# Patient Record
Sex: MALE | Race: WHITE | URBAN - METROPOLITAN AREA
[De-identification: names, ages, dates, MRNs, and addresses within clinical notes are randomized per-mention and may not be internally consistent; named-entity substitution may affect disease eponyms.]

---

## 2020-07-16 ENCOUNTER — HOSPITAL ENCOUNTER (OUTPATIENT)
Age: 50
Discharge: HOME | End: 2020-07-18
Payer: COMMERCIAL

## 2020-07-16 LAB
ALBUMIN SERPL-MCNC: 4.8 G/DL (ref 3.5–5.2)
ALP BLD-CCNC: 93 U/L (ref 40–129)
ALT SERPL-CCNC: 28 U/L (ref 0–40)
ANION GAP SERPL CALCULATED.3IONS-SCNC: 15 MMOL/L (ref 7–16)
AST SERPL-CCNC: 24 U/L (ref 0–39)
BASOPHILS ABSOLUTE: 0.02 E9/L (ref 0–0.2)
BASOPHILS RELATIVE PERCENT: 0.3 % (ref 0–2)
BILIRUB SERPL-MCNC: 0.4 MG/DL (ref 0–1.2)
BUN BLDV-MCNC: 16 MG/DL (ref 6–20)
CALCIUM SERPL-MCNC: 9.6 MG/DL (ref 8.6–10.2)
CHLORIDE BLD-SCNC: 97 MMOL/L (ref 98–107)
CHOLESTEROL, TOTAL: 176 MG/DL (ref 0–199)
CO2: 25 MMOL/L (ref 22–29)
CREAT SERPL-MCNC: 1 MG/DL (ref 0.7–1.2)
EOSINOPHILS ABSOLUTE: 0.14 E9/L (ref 0.05–0.5)
EOSINOPHILS RELATIVE PERCENT: 1.9 % (ref 0–6)
GFR AFRICAN AMERICAN: >60
GFR NON-AFRICAN AMERICAN: >60 ML/MIN/1.73
GLUCOSE BLD-MCNC: 87 MG/DL (ref 74–99)
HCT VFR BLD CALC: 45.3 % (ref 37–54)
HDLC SERPL-MCNC: 46 MG/DL
HEMOGLOBIN: 15.4 G/DL (ref 12.5–16.5)
IMMATURE GRANULOCYTES #: 0.02 E9/L
IMMATURE GRANULOCYTES %: 0.3 % (ref 0–5)
LDL CHOLESTEROL CALCULATED: 98 MG/DL (ref 0–99)
LYMPHOCYTES ABSOLUTE: 2.53 E9/L (ref 1.5–4)
LYMPHOCYTES RELATIVE PERCENT: 35.1 % (ref 20–42)
MCH RBC QN AUTO: 29.8 PG (ref 26–35)
MCHC RBC AUTO-ENTMCNC: 34 % (ref 32–34.5)
MCV RBC AUTO: 87.6 FL (ref 80–99.9)
MONOCYTES ABSOLUTE: 0.51 E9/L (ref 0.1–0.95)
MONOCYTES RELATIVE PERCENT: 7.1 % (ref 2–12)
NEUTROPHILS ABSOLUTE: 3.99 E9/L (ref 1.8–7.3)
NEUTROPHILS RELATIVE PERCENT: 55.3 % (ref 43–80)
PDW BLD-RTO: 12.3 FL (ref 11.5–15)
PLATELET # BLD: 245 E9/L (ref 130–450)
PMV BLD AUTO: 11.5 FL (ref 7–12)
POTASSIUM SERPL-SCNC: 4.2 MMOL/L (ref 3.5–5)
RBC # BLD: 5.17 E12/L (ref 3.8–5.8)
SODIUM BLD-SCNC: 137 MMOL/L (ref 132–146)
TOTAL PROTEIN: 7.9 G/DL (ref 6.4–8.3)
TRIGL SERPL-MCNC: 159 MG/DL (ref 0–149)
TSH SERPL DL<=0.05 MIU/L-ACNC: 1.86 UIU/ML (ref 0.27–4.2)
VLDLC SERPL CALC-MCNC: 32 MG/DL
WBC # BLD: 7.2 E9/L (ref 4.5–11.5)

## 2020-07-16 PROCEDURE — 80053 COMPREHEN METABOLIC PANEL: CPT

## 2020-07-16 PROCEDURE — 85025 COMPLETE CBC W/AUTO DIFF WBC: CPT

## 2020-07-16 PROCEDURE — 80061 LIPID PANEL: CPT

## 2020-07-16 PROCEDURE — 84443 ASSAY THYROID STIM HORMONE: CPT

## 2024-01-30 ENCOUNTER — OFFICE VISIT (OUTPATIENT)
Dept: OTOLARYNGOLOGY | Facility: CLINIC | Age: 54
End: 2024-01-30
Payer: COMMERCIAL

## 2024-01-30 ENCOUNTER — CLINICAL SUPPORT (OUTPATIENT)
Dept: AUDIOLOGY | Facility: CLINIC | Age: 54
End: 2024-01-30
Payer: COMMERCIAL

## 2024-01-30 VITALS — TEMPERATURE: 97.9 F | WEIGHT: 315 LBS

## 2024-01-30 DIAGNOSIS — H93.8X3 SENSATION OF FULLNESS IN BOTH EARS: ICD-10-CM

## 2024-01-30 DIAGNOSIS — H93.13 TINNITUS OF BOTH EARS: ICD-10-CM

## 2024-01-30 DIAGNOSIS — H90.3 SENSORINEURAL HEARING LOSS (SNHL) OF BOTH EARS: ICD-10-CM

## 2024-01-30 DIAGNOSIS — R42 DIZZINESS: Primary | ICD-10-CM

## 2024-01-30 DIAGNOSIS — R51.9 NONINTRACTABLE EPISODIC HEADACHE, UNSPECIFIED HEADACHE TYPE: ICD-10-CM

## 2024-01-30 DIAGNOSIS — H90.3 SENSORINEURAL HEARING LOSS (SNHL) OF BOTH EARS: Primary | ICD-10-CM

## 2024-01-30 PROCEDURE — 92557 COMPREHENSIVE HEARING TEST: CPT | Performed by: AUDIOLOGIST

## 2024-01-30 PROCEDURE — 99204 OFFICE O/P NEW MOD 45 MIN: CPT | Performed by: NURSE PRACTITIONER

## 2024-01-30 PROCEDURE — 92550 TYMPANOMETRY & REFLEX THRESH: CPT | Performed by: AUDIOLOGIST

## 2024-01-30 RX ORDER — TIRZEPATIDE 2.5 MG/.5ML
INJECTION, SOLUTION SUBCUTANEOUS
COMMUNITY
Start: 2024-01-11

## 2024-01-30 RX ORDER — ESOMEPRAZOLE MAGNESIUM 20 MG/1
TABLET, DELAYED RELEASE ORAL
COMMUNITY

## 2024-01-30 ASSESSMENT — PATIENT HEALTH QUESTIONNAIRE - PHQ9
2. FEELING DOWN, DEPRESSED OR HOPELESS: NOT AT ALL
SUM OF ALL RESPONSES TO PHQ9 QUESTIONS 1 AND 2: 0
1. LITTLE INTEREST OR PLEASURE IN DOING THINGS: NOT AT ALL

## 2024-01-30 NOTE — PROGRESS NOTES
Chief Complaint   Patient presents with    Hearing Loss    Tinnitus    Ear Pressure    Ear Fullness     HISTORY:  Ramirez Lazo, age 53 years, was seen for audiogram in conjunction with otolaryngology appointment on 1/30/2024.  Mr. Burnett reports history of low level tinnitus beginning years ago.  Three weeks ago he noted bilateral ear blockage, decreased hearing and increase in tinnitus both ears.  He was seen by his primary care physician was was prescribed oral steroids and ear drops.  He noted an improvement in hearing and pressure but the symptoms returned.  He notes dizziness when bending over and overall imbalance.  He has an extensive history of occupational and recreational noise exposure.    RESULTS:  Prior to testing both external auditory canals were clear and tympanic membranes visualized    Immittance and acoustic reflexes:  Immittance testing yielded TYPE A tympanograms indicating normal middle ear function both ears  Acoustic reflexes were present 500 - 4000 Hz both ears    Audiogram:  Normal hearing levels were obtained 250 - 2000 Hz with a moderate to mild sensorineural hearing loss 3000 -8000 Hz both ears  Speech reception thresholds obtained at 10 dBHL both ears  Speech discrimination scores were 100% at 50 dBHL    Distortion product otoacoustic emissions:  Robust emissions were obtained at 2000 Hz and absent 3000  -8000 Hz both ears    IMPRESSIONS:  Moderate to mild high frequency sensorineural hearing loss with tinnitus both ears    RECOMMENDATIONS:  1.  Follow up with otolaryngology  2.  Retest hearing levels annually    time: 1321 - 1341

## 2024-01-31 NOTE — PROGRESS NOTES
"Subjective   Patient ID: Ramirez Lazo \"Jesse\" is a 53 y.o. male who presents for Vertigo.  HPI  This patient is referred for evaluation of  episodic non-vertiginous dizziness. The patient is not accompanied by anyone. The approximate duration of his complaints is 3 weeks.    The patient describes his dizziness as feeling lightheaded after bending over as well as feeling very off balance.  He denies any true vertigo.  When asked about ear pain, headache, phono-photophobia, visual or motion intolerance, sound or pressure induced symptoms, hearing loss, discharge from ear, tinnitus, aural fullness or autophony, the patient admits to headache for 5 days straight (+ hx of migraine), phono sensitivity, bilateral fullness, bilateral tinnitus, autophony, and some difficulty hearing.  Patient reports that his migraines were very bad in the 1990s through 2000's but then essentially resolved.  Prior to his dizziness, he reports sudden onset of bilateral fullness with significant increase in his bilateral tinnitus.  Since then he describes fluctuating bilateral fullness, tinnitus, a \"metallic tingling sound\" when he is talking.  Patient reports longstanding history of bilateral tinnitus and what he is currently experiencing is much more severe.    When asked about a significant past otological history including history of prior ear surgery, noise exposure, exposure to ototoxic drugs or agents, and/or family history of hearing loss, the patient admits to occupational and recreational noise exposure, father with hearing loss of unknown etiology.    Review of Systems  A comprehensive or 10 points review of the patient´s constitutional, neurological, HEENT, pulmonary, cardiovascular and genito-urinary systems showed only those mentioned in history of present illness.    Objective   Physical Exam  Constitutional: no fever, chills, weight loss or weight gain   General appearance: Appears well, well-nourished, well groomed. No acute " distress.   Communication: Normal communication   Psychiatric: Oriented to person, place and time. Normal mood and affect.   Neurologic: Cranial nerves II-XII grossly intact and symmetric bilaterally.   Head and Face:   Head: Atraumatic with no masses, lesions or scarring.   Face: Normal symmetry, no paralysis, synkinesis or facial tic. No scars or deformities.   TMJ: Bilateral crepitus  Eyes: Conjunctiva not edematous or erythematous   Ears: External inspection of ears with no deformity, scars or masses. Bilateral EACs clear and bilateral TMs intact with no signs of effusions     Neck: Normal appearing, symmetric, trachea midline.   Cardiovascular: Examination of peripheral vascular system shows no clubbing or cyanosis.   Respiratory: No respiratory distress increased work of breathing. Inspection of the chest with symmetric chest expansion and normal respiratory effort.   Skin: No rashes in the head or neck  Bedside occulomotor function assessment for ocular pursuits and saccades, spontaneous nystagmus showed right gaze evoked nystagmus.  Bilateral head thrust negative  Romberg unsteady, patient swaying front to back but able to compensate  Fukuda normal  Tandem gait normal  Michael-Hallpike deferred  My interpretation of the audiogram done today is normal hearing through 2000 Hz sloping to moderate sensorineural hearing loss bilaterally.  Excellent word recognition scores and normal tympanograms.  Assessment/Plan     This patient presents for initial evaluation of acute acquired dizziness and bilateral aural fullness as well as chronic bilateral subjective tinnitus, headache, and bilateral sensorineural hearing loss.    Reassurance given that otologic exam today is normal.  Audiogram was reviewed in detail.  We discussed that the pattern of his hearing loss is consistent with noise exposure, but he is not yet a candidate for hearing amplification.  We discussed that his longstanding tinnitus may be a combination of  his hearing loss and chronic TMJ dysfunction.  We discussed that TMJ dysfunction can also cause aural fullness, but would not cause his dizziness.  Patient states that when he had the episode of sudden aural fullness, tinnitus he did not notice a change in his hearing and he did not have dizziness.  We discussed that Ménière's disease is unlikely.  The physiology of balance control was explained. The likely possible etiologies were reviewed. I believe the patient does not likely have a peripheral vestibular disorder. I recommended further evaluation with VNG/vHIT/VEMP testing.  I am happy to discuss results over the phone.  We discussed that his dizzy symptoms may be a migraine variant.  I recommended dietary modifications for migraine and referral to neurology.  Patient is in agreement with the plan.  All questions were answered to patient's satisfaction.      45 minutes was spent on this patient´s visit. More than 50% of that time was spent in counseling regarding the possible etiologies, test results, treatment options and coordinating care.    This note was created using speech recognition transcription software. Despite proofreading, several typographical errors might be present that might affect the meaning of the content. Please call with any questions.           MARIA GUADALUPE Gardiner-CNP 01/31/24 12:45 PM

## 2024-02-13 ENCOUNTER — APPOINTMENT (OUTPATIENT)
Dept: NEUROLOGY | Facility: HOSPITAL | Age: 54
End: 2024-02-13
Payer: COMMERCIAL

## 2024-02-15 ENCOUNTER — APPOINTMENT (OUTPATIENT)
Dept: CARDIOLOGY | Facility: HOSPITAL | Age: 54
End: 2024-02-15
Payer: COMMERCIAL

## 2024-02-15 ENCOUNTER — HOSPITAL ENCOUNTER (EMERGENCY)
Facility: HOSPITAL | Age: 54
Discharge: HOME | End: 2024-02-15
Attending: STUDENT IN AN ORGANIZED HEALTH CARE EDUCATION/TRAINING PROGRAM
Payer: COMMERCIAL

## 2024-02-15 VITALS
TEMPERATURE: 98.1 F | DIASTOLIC BLOOD PRESSURE: 91 MMHG | HEART RATE: 100 BPM | SYSTOLIC BLOOD PRESSURE: 147 MMHG | RESPIRATION RATE: 18 BRPM | WEIGHT: 315 LBS | BODY MASS INDEX: 39.17 KG/M2 | HEIGHT: 75 IN | OXYGEN SATURATION: 97 %

## 2024-02-15 DIAGNOSIS — R42 VERTIGO: Primary | ICD-10-CM

## 2024-02-15 PROCEDURE — 2500000001 HC RX 250 WO HCPCS SELF ADMINISTERED DRUGS (ALT 637 FOR MEDICARE OP): Performed by: STUDENT IN AN ORGANIZED HEALTH CARE EDUCATION/TRAINING PROGRAM

## 2024-02-15 PROCEDURE — 93005 ELECTROCARDIOGRAM TRACING: CPT

## 2024-02-15 PROCEDURE — 99283 EMERGENCY DEPT VISIT LOW MDM: CPT | Mod: 25 | Performed by: STUDENT IN AN ORGANIZED HEALTH CARE EDUCATION/TRAINING PROGRAM

## 2024-02-15 RX ORDER — MECLIZINE HYDROCHLORIDE 25 MG/1
25 TABLET ORAL 3 TIMES DAILY PRN
Qty: 30 TABLET | Refills: 0 | Status: SHIPPED | OUTPATIENT
Start: 2024-02-15 | End: 2024-02-25

## 2024-02-15 RX ORDER — MECLIZINE HCL 12.5 MG 12.5 MG/1
25 TABLET ORAL ONCE
Status: COMPLETED | OUTPATIENT
Start: 2024-02-15 | End: 2024-02-15

## 2024-02-15 RX ADMIN — MECLIZINE 25 MG: 12.5 TABLET ORAL at 17:12

## 2024-02-15 ASSESSMENT — PAIN SCALES - GENERAL: PAINLEVEL_OUTOF10: 2

## 2024-02-15 ASSESSMENT — LIFESTYLE VARIABLES
HAVE PEOPLE ANNOYED YOU BY CRITICIZING YOUR DRINKING: NO
HAVE YOU EVER FELT YOU SHOULD CUT DOWN ON YOUR DRINKING: NO
EVER HAD A DRINK FIRST THING IN THE MORNING TO STEADY YOUR NERVES TO GET RID OF A HANGOVER: NO
EVER FELT BAD OR GUILTY ABOUT YOUR DRINKING: NO

## 2024-02-15 ASSESSMENT — COLUMBIA-SUICIDE SEVERITY RATING SCALE - C-SSRS
6. HAVE YOU EVER DONE ANYTHING, STARTED TO DO ANYTHING, OR PREPARED TO DO ANYTHING TO END YOUR LIFE?: NO
2. HAVE YOU ACTUALLY HAD ANY THOUGHTS OF KILLING YOURSELF?: NO
1. IN THE PAST MONTH, HAVE YOU WISHED YOU WERE DEAD OR WISHED YOU COULD GO TO SLEEP AND NOT WAKE UP?: NO

## 2024-02-15 ASSESSMENT — PAIN - FUNCTIONAL ASSESSMENT: PAIN_FUNCTIONAL_ASSESSMENT: 0-10

## 2024-02-15 NOTE — ED PROVIDER NOTES
Chief Complaint   Patient presents with    vertigo symptoms     vertigo symptoms. Has appointment with ENT Feb 28th with tests scheduled. But says he can't deal with the ringing until then.         HPI:  53 y.o. male With history of hypertension presenting to the ED with dizziness and tinnitus.  Patient reports having a longstanding history of tinnitus that worsened about 6 weeks ago.  Has fluctuations in hearing and dizziness as well.  He is not currently having any difficulty with dizziness or ambulation but says that the ringing in his ears today was unbearable.  He has been evaluated by ENT for this and has specialized testing that is ordered.  He was hoping that there is something could be done to facilitate the testing or resolved the tinnitus, prompting ED visit today.  Patient has no headache, vision changes, neck stiffness.  No fevers or chills.  No focal numbness/weakness/paresthesias.  No nausea or vomiting.  No recent trauma or injuries.    History provided by: patient  Limitations to history: none    ------------------------------------------------------------------------------------------------------------------------------------------    ED Triage Vitals [02/15/24 1451]   Temperature Heart Rate Respirations BP   36.7 °C (98.1 °F) 100 18 (!) 147/91      Pulse Ox Temp Source Heart Rate Source Patient Position   97 % Temporal Monitor Sitting      BP Location FiO2 (%)     Left arm --          Physical Exam:  Triage vitals reviewed.  Constitutional: Well developed adult in no acute distress, non toxic in appearance  Head: Normocephalic, atraumatic  Skin: Intact, dry. No rashes or lesions.  Eyes: Pupils are equal, reactive to light bilaterally. EOMI without nystagmus. No conjunctival injection.  Neck: Supple. Trachea is midline.  Pulmonary: Normal work of breathing with no accessory muscle use noted.  Clear to auscultation bilaterally.   Cardiovascular: RRR. 2+ radial pulses bilaterally.   Abdomen: Soft,  nondistended. Nontender to palpation.  Extremities: No gross deformities.  Moving all extremities spontaneously without difficulty.  Neuro: Awake and alert. Answers all questions appropriately. Speech is clear. Face is symmetric without facial droop and facial sensation to light touch equal bilaterally. Uvula midline. Tongue protrusion midline. Hearing intact bilaterally. Full and equal shoulder shrug and head turn against resistance. 5/5 motor strength of UEs and LEs. Sensation to light touch intact in all four extremities. Finger to nose and heel to shin intact. No pronator drift. No gait abnormalities.   Psych: Appropriate mood and affect.    ------------------------------------------------------------------------------------------------------------------------------------------    Medical Decision Making:  This patient is a 53 y.o. male who is presenting to the ED with acute on chronic tinnitus with intermittent dizziness and fluctuations in hearing.  He is only currently experiencing the tinnitus.  Neurologic exam is unremarkable.  No other acute neurologic symptoms that would suggest intracranial hemorrhage, CVA, or malignancy.  He is scheduled for audiometry/vestibular testing which I explained cannot be done in the emergency department.  I did reassure patient on his normal exam and my suspicion that life-threatening cause of his tinnitus was unlikely.  Discussed CT head though he politely declined.  He was given meclizine for his symptoms and will be given prescription for this as well.  Did discuss return precautions and patient discharged in stable condition.      Diagnoses as of 02/15/24 1756   Vertigo        Clinical Impression:  Subacute tinnitus and vertigo    Disposition:  Discharge to home    Jaclyn Brown DO  Emergency Medicine         Jaclyn Brown DO  03/05/24 022

## 2024-02-18 LAB
ATRIAL RATE: 83 BPM
P AXIS: 15 DEGREES
PR INTERVAL: 153 MS
Q ONSET: 254 MS
QRS COUNT: 13 BEATS
QRS DURATION: 96 MS
QT INTERVAL: 369 MS
QTC CALCULATION(BAZETT): 434 MS
QTC FREDERICIA: 411 MS
R AXIS: -14 DEGREES
T AXIS: 49 DEGREES
T OFFSET: 438 MS
VENTRICULAR RATE: 83 BPM

## 2024-02-21 ENCOUNTER — CLINICAL SUPPORT (OUTPATIENT)
Dept: AUDIOLOGY | Facility: CLINIC | Age: 54
End: 2024-02-21
Payer: COMMERCIAL

## 2024-02-21 DIAGNOSIS — H93.8X3 SENSATION OF FULLNESS IN BOTH EARS: ICD-10-CM

## 2024-02-21 DIAGNOSIS — H90.3 SENSORINEURAL HEARING LOSS (SNHL) OF BOTH EARS: Primary | ICD-10-CM

## 2024-02-21 DIAGNOSIS — H93.13 TINNITUS OF BOTH EARS: ICD-10-CM

## 2024-02-21 PROCEDURE — 92557 COMPREHENSIVE HEARING TEST: CPT | Performed by: AUDIOLOGIST

## 2024-02-21 PROCEDURE — 92567 TYMPANOMETRY: CPT | Performed by: AUDIOLOGIST

## 2024-02-21 NOTE — PROGRESS NOTES
"AUDIOMETRIC EVALUATION       Name:  Ramirez Bedoya"Kamla"  :  1970  Age:  53 y.o.  Date of Evaluation:  2024     HISTORY  Ramirez Woodson" was seen today for a hearing evaluation due to sudden decrease in left ear hearing and increase in tinnitus. Reports woke up and could barely hear from the left ear. Reports on his drive into the appointment noticed a warm liquid feeling in his left (inside, could not feel liquid when he touched his ear) and his hearing returned. Reports tinnitus is louder in the left ear still. Has had fluctuating hearing loss, fullness, ringing, and imbalance, dizziness when bending over for the last 2 months. History of noise exposure.    Denies aural pain, drainage, fullness, history of familial hearing loss.    PROCEDURE:  Otoscopic Evaluation:    RIGHT: Clear ear canal and tympanic membrane visualized.  LEFT:  Clear ear canal and tympanic membrane visualized.    Immittance: Tympanometry (226 Hz probe tone) and Stapedial Acoustic Reflexes Thresholds (ART)(Probe ear):  RIGHT: Normal middle ear pressure, mobility, and ear canal volume. Ipsilateral ART present 500-2000 Hz.  LEFT: Normal middle ear pressure, mobility, and ear canal volume. Ipsilateral ART present 500-2000 Hz.    Pure Tone and Speech Audiometry:    Test Technique: Pure Tone Audiometry via insert earphones  Test Reliability: good    RIGHT: Normal hearing through 2000 Hz sloping to moderately severe  sensorineural hearing loss through 8000 Hz. Word Recognition score was excellent using recorded material (NU-6 10-word list ordered by difficulty).   LEFT:  Normal hearing through 2000 Hz sloping to moderately severe  sensorineural hearing loss through 8000 Hz. Word Recognition score was excellent using recorded material (NU-6 10-word list ordered by difficulty).     Distortion Product Otoacoustic Emissions (DPOAE):  DPOAE assesses cochlear outer hair cell function at the frequencies tested in quarter-octave bands " "(7174-4949 Hz)        RIGHT: Absent at all frequencies tested.        LEFT: Present at 0964-7371 and 5001-2533 Hz, absent at all other frequencies tested.    Present OAEs suggest normal cochlear outer hair cell function.  Absent OAEs are consistent with some degree of hearing loss and/or outer hair cell dysfunction.     EVALUATION  See scanned Audiogram in \"Media\".    IMPRESSIONS:  Today's test results indicate essentially stable hearing as compared to Jan, 2024 testing, slight decrease in air conduction/hearing at 500 Hz in the left ear. He reports different sound quality of speech in the left ear during testing.    RECOMMENDATIONS:  Continue medical follow-up with physician.  Return for audiologic assessment in conjunction with otologic care or annually.   Ringing of the ears, also known as tinnitus is often a symptom of hearing loss but can be associated with something other than the ears. Tinnitus coping strategies discussed including amplification and staying in a sound enriched environment (use of a fan or white/musical sound maker at night or when tinnitus is bothersome). If tinnitus becomes bothersome, there are other FDA devices that may provide relief from tinnitus including maskers within amplification and other stimulating devices.    PATIENT EDUCATION:   Discussed results and recommendations with Ramirez Lazo \"Jesse\".  Questions were addressed and the patient was encouraged to contact our department ([phone]) should concerns arise.    BRITTNEY Asher, CCC-A  Senior Clinical Audiologist    TIME: 340-400    "

## 2024-02-27 NOTE — PROGRESS NOTES
"ADULT BALANCE FUNCTION TEST (BFT)      Name:  Ramirez Lazo \"Kamla"  :  1970  Age:  53 y.o.  Date of Evaluation:  2024     IMPRESSIONS     Overall normal vestibular examination; no evidence of active vestibular system involvement to account for patient reported symptoms. There were no indications of peripheral or central vestibular system pathway involvement. There were no indications of active Benign Paroxysmal Positional Vertigo (BPPV) present. Normal observation of gait and transfers. Bedside postural control findings demonstrate normal functional balance with varying sensory information measured on the mCTSIB. Patient's risk of falling based on today's evaluation is low.    RECOMMENDATIONS     Consider re-evaluation as medically indicated.  Maintain a healthy lifestyle to help body function overall.  Continue monitoring per ENT/PCP preference.    Time: 8269-8662    HISTORY     Patient was seen for Balance Function Testing (BFT) due to a history of dizziness/imbalance. Vestibular case history collected via patient-clinician interview, patient chart review, and patient questionnaires.    Patient reported history of dizziness described as lightheadedness and unsteadiness.  Symptoms began 2 months ago.  Episodes occur *** and last several *** before subsiding.  Most recent episode occurred ***.  Associated symptoms include ***.  Symptoms are provoked by bending over, .  Symptoms are alleviated by ***.  Overall the patient's symptoms have *** over time.  This patient has had a total of *** falls due to their symptoms.   Medical history includes migraines, sudden hearing loss, bilateral aural fullness and tinnitus, .  Denied any symptoms provoked by sneezing or coughing, as well as any presence of autophony.   Denied any recent medication changes.   Patient reported complying with pre-test instructions. No significant neck pain or restriction which would contraindicate portions of testing today.    Most " "recent audiologic evaluation performed on 2/27/2024  by MALVIN Santana under the supervision of Yesi Jamison CCC-A CCVR  revealed ***. Tympanometry revealed normal eardrum mobility and canal volume, bilaterally.    Olga Levy CNP on 1/30/2024:  Bedside occulomotor function assessment for ocular pursuits and saccades, spontaneous nystagmus showed right gaze evoked nystagmus.  Bilateral head thrust negative  Romberg unsteady, patient swaying front to back but able to compensate  Fukuda normal  Tandem gait normal  [place]-Hallpike deferred    EVALUATION     See VNG, vHIT, & VEMP Raw Data in \"Media\"    TEST RESULTS     BEDSIDE ASSESSMENT TEST  The bedside assessment is an optional portion of the test battery to further assist in differential diagnosis and screen for eye abnormalities which may affect testing.    Otoscopy: unremarkable.  Tympanometry: normal ear canal volume, peak pressure, and compliance, consistent with normal middle ear function (Type A), bilaterally.***  Extra-Ocular Range of Motion: normal. Extra-Ocular Range of Motion is performed to evaluate any eye abnormalities prior to testing.  Cover/Uncover: normal. Cover/Uncover test is performed to evaluate for skewed deviation of the eyes prior to testing.  Cervical Neck Exam: normal. Cervical Neck is performed to evaluate any restrictions or pain with neck movement prior to testing.  Vertebral Artery Screen: normal. Vertebral Artery Screen is performed to evaluate for vertebrobasilar insufficiency prior to testing.   Ocular Counter-Roll: normal. Ocular Counter-Roll is performed to evaluate ocular tilt reaction and assess otolith organ function prior to testing.  VOR Cancellation: normal. VOR Cancellation is performed to evaluate fixation suppression prior to testing.  Dynamic Visual Acuity: normal. DVA is performed to evaluate the VOR and visual/vestibular interaction.  Modified Clinical Test of Sensory Interaction on Balance (mCTSIB): normal. mCTSIB " is performed to evaluate balance with varying sensory information.      VIDEONYSTAGMOGRAPHY (VNG) TEST  VNG provides objective indications of peripheral and central vestibulo-ocular pathway involvement. Ocular motor testing to visually guided targets is conducted using a dual channel video-recording technique for the recording of eye movement in the horizontal and vertical planes. Air caloric testing is performed at 48 degrees C and 24 degrees C.    Spontaneous Nystagmus test was absent. Spontaneous nystagmus testing may help with the identification of an acute or uncompensated peripheral vestibular lesion.   Gaze Nystagmus test was normal. Gaze nystagmus testing is to evaluate for nystagmus that is evoked by holding eye gaze in any particular direction. True gaze nystagmus is amplified when vision is denied.   Random Saccades test was normal. Random saccade testing is to evaluate patient's ability to make fast random eye movements along a horizontal moving target.   Smooth Pursuit/Tracking test was normal. Smooth pursuit/tracking testing is to evaluate the ability to move eyes with a single smoothly moving target.   Optokinetic nystagmus testing was normal. This full-field OPK test is to evaluate the ability of central nervous system to stabilize vision during sustained head movement after the VOR system loses effectiveness.   Michael-Hallpike testing was normal. [place]-Hallpike testing is to provide a diagnosis of Benign Paroxysmal Positional Vertigo (BPPV) of the vertical semicircular canals on the side which is most affected.  Roll testing was normal. Roll testing is to provide a diagnosis of Benign Paroxysmal Positional Vertigo (BPPV) of the horizontal semicircular canals on the side which is most affected.  Positional testing was normal. Positional testing is to evaluate patient's ability to hold a steady gaze while in different positions.  *** caloric testing was normal. Unilateral weakness of ***% to the *** which  is normal and a directional preponderance of ***% to the *** which is normal. Caloric testing is to evaluate for peripheral vestibular lesion.    NOTE: monothermal caloric testing is indicated when:  slow phase velocity of the nystagmus is 8 degrees/second or greater  less than 15% difference in the degree of nystagmus between the ears  spontaneous or positional nystagmus observed during testing is less than 5 degrees/second      VIDEO HEAD IMPULSE TEST (vHIT)  The vHIT procedure provides objective assessment of the high frequency vestibulo-ocular reflex (VOR) for each semicircular canal. Rapid, random horizontal and vertical thrusts are applied to the patient's head to provoke the VOR. The vHIT procedure includes two separate paradigms: Head Impulse Paradigm (HIMP) and Suppression Head Impulse Paradigm (SHIMP). SHIMP is an optional paradigm that is not appropriate to perform for every patient. However, it is appropriate to perform SHIMP when there is verified evidence of possible vestibulopathy in the traditional HIMP test.     Head Impulse Paradigm (HIMP)   Right Ear   Canal Gain Overt Saccades Covert Saccades   Lateral *** absent absent   Anterior *** absent absent   Posterior *** absent absent        Head Impulse Paradigm (HIMP)   Left Ear   Canal Gain Overt Saccades Covert Saccades   Lateral *** absent absent   Anterior *** absent absent   Posterior *** absent absent     Total gain for all canals tested were within normal limits (<0.80 is abnormal for lateral, <0.70 is abnormal for vertical).  There was no evidence of overt or covert saccades throughout testing.      Suppression Head Impulse Paradigm (SHIMP)    Canal Gain Corrective Anti-Compensatory Saccades   Right Lateral *** present   Left Lateral *** present     Total gain for lateral canals tested were within normal limits (<0.80 is abnormal for lateral).  There was evidence of appropriate corrective anti-compensatory saccades throughout  testing.      Testing and interpretation of results completed by ELLE Santana and Yesi Jamison CCC-A CCKALA. It was our pleasure to evaluate this patient.         Yesi Jamison, CCC-A CCVR  Senior Clinical Vestibular Audiologist

## 2024-02-28 ENCOUNTER — CLINICAL SUPPORT (OUTPATIENT)
Dept: AUDIOLOGY | Facility: CLINIC | Age: 54
End: 2024-02-28
Payer: COMMERCIAL

## 2024-02-28 DIAGNOSIS — R42 DIZZINESS: Primary | ICD-10-CM

## 2024-02-28 PROCEDURE — 92540 BASIC VESTIBULAR EVALUATION: CPT

## 2024-02-28 PROCEDURE — 92537 CALORIC VSTBLR TEST W/REC: CPT

## 2024-02-28 PROCEDURE — 92519 VEMP TST I&R CERVICAL&OCULAR: CPT

## 2024-02-28 NOTE — PATIENT INSTRUCTIONS
BALANCE FUNCTION TEST (BFT)  AFTER VISIT SUMMARY      TESTING SUMMARY     The purpose of today's testing was to evaluate for any vestibular system (inner ear) involvement to account for your symptoms of dizziness/imbalance. Deep inside each of your ears, there are 5 balance organs which contribute to your ability to maintain balance and reduce dizziness. Our vestibular system involves 3 semicircular canals (“spinning detectors”) and 2 otolith organs (“gravity sensors”).    IMPRESSIONS     Based on today's evaluation, your vestibular system appears to be borderline weakened on the left and possibly contributing as a source for your symptoms. Further investigation is warranted at this time.    RECOMMENDATIONS     Continue medical follow up with Olga Levy CNP.   Consider re-evaluation as medically indicated.  Maintain a healthy lifestyle to help body function overall.    Testing and interpretation of results completed by Yesi Jamison CCC-A CCVR. It was my pleasure to evaluate this patient.       Yesi Jamison, CCC-A CCVR  Senior Clinical Vestibular Audiologist

## 2024-02-28 NOTE — PROGRESS NOTES
"ADULT BALANCE FUNCTION TEST (BFT)      Name:  Ramirez Lazo \"Kamla"  :  1970  Age:  53 y.o.  Date of Evaluation:  2024     IMPRESSIONS     Suspected peripheral vestibular involvement given the following: borderline asymmetric caloric irrigations (weaker to the left). Note that current clinical balance tests cannot distinguish between lesions of the labyrinth, VIIIth nerve, or root entry zone of the brainstem vestibular nuclei, thus the phrase peripheral refers to all of the structures. No further indications of peripheral vestibular involvement.     There were no indications of central vestibular system pathway involvement. There were no indications of active Benign Paroxysmal Positional Vertigo (BPPV) present. Normal observation of gait and transfers. Patient's risk of fall based on today's evaluation is low.    RECOMMENDATIONS     Consider vestibular physical therapy to address uncompensated unilateral vestibulopathy. Emphasis on gaze stabilization exercises for VOR gain, habituation exercises to triggers, and fall risk prevention.   Further investigation into possible Meniere's Disease to account for patient reported symptoms is warranted given fluctuating auditory symptoms with onset of dizziness.  Consider re-evaluation as medically indicated.  Maintain a healthy lifestyle to help body function overall.  Continue monitoring per ENT/PCP preference.    Time: 2615-9957    HISTORY     Patient was seen for Balance Function Testing (BFT) due to a history of dizziness/imbalance. Vestibular case history collected via patient-clinician interview, patient chart review, and patient questionnaires.    Patient reported history of dizziness described as lightheadedness and unsteadiness.  Symptoms began 2 months ago and fluctuates daily.  Episodes occur daily and last several seconds to minutes before subsiding.  Associated symptoms include rocking sensation/bumping sensation, vertigo/spinning (extremely brief), " "and fluctuating \"head cold\" sensation.  Symptoms are provoked by bending over, looking down, and standing quickly, however notes symptoms can occur without any specific triggers.  Symptoms are unable to be alleviated.  Overall the patient's symptoms have become more consistent over time.  This patient has had no true falls due to their symptoms.   Medical history includes migraines, sudden fluctuating hearing loss, bilateral fluctuating aural fullness and tinnitus, and previous shoulder surgery (one month prior to onset of symptoms).  Denied any symptoms provoked by sneezing or coughing, as well as any presence of autophony.   Patient reported complying with pre-test instructions. No significant neck pain or restriction which would contraindicate portions of testing today.    Most recent audiologic evaluation performed on 02/21/2024 by Yesi Carpio CCC-A revealed normal hearing sensitivity sloping to a moderately-severe SNHL, bilaterally. Tympanometry revealed normal eardrum mobility and canal volume, bilaterally.    Most recent vertigo evaluation performed on 01/30/2024 by Olga Levy CNP revealed bedside occulomotor function assessment for ocular pursuits and saccades was normal, spontaneous nystagmus showed right gaze evoked nystagmus, bilateral head thrust negative, Romberg unsteady (patient swaying front to back but able to compensate), Fukuda normal, Tandem gait normal, Tipton-Hallpike deferred.    EVALUATION     See VNG, vHIT, & VEMP Raw Data in \"Media\"    TEST RESULTS     BEDSIDE ASSESSMENT TEST  The bedside assessment is an optional portion of the test battery to further assist in differential diagnosis and screen for eye abnormalities which may affect testing.    Otoscopy: unremarkable.  Extra-Ocular Range of Motion: normal. Extra-Ocular Range of Motion is performed to evaluate any eye abnormalities prior to testing.  Cover/Uncover: normal. Cover/Uncover test is performed to evaluate for skewed " deviation of the eyes prior to testing.  Cervical Neck Exam: normal. Cervical Neck is performed to evaluate any restrictions or pain with neck movement prior to testing.  Vertebral Artery Screen: normal. Vertebral Artery Screen is performed to evaluate for vertebrobasilar insufficiency prior to testing.   Ocular Counter-Roll: normal. Ocular Counter-Roll is performed to evaluate ocular tilt reaction and assess otolith organ function prior to testing.  VOR Cancellation: normal. VOR Cancellation is performed to evaluate fixation suppression prior to testing.      VIDEONYSTAGMOGRAPHY (VNG) TEST  VNG provides objective indications of peripheral and central vestibulo-ocular pathway involvement. Ocular motor testing to visually guided targets is conducted using a dual channel video-recording technique for the recording of eye movement in the horizontal and vertical planes. Air caloric testing is performed at 48 degrees C and 24 degrees C.    Spontaneous Nystagmus test was absent. Spontaneous nystagmus testing may help with the identification of an acute or uncompensated peripheral vestibular lesion.   Gaze Nystagmus test was normal. Gaze nystagmus testing is to evaluate for nystagmus that is evoked by holding eye gaze in any particular direction. True gaze nystagmus is amplified when vision is denied.   Random Saccades test was normal. Random saccade testing is to evaluate patient's ability to make fast random eye movements along a horizontal moving target.   Smooth Pursuit/Tracking test was normal. Smooth pursuit/tracking testing is to evaluate the ability to move eyes with a single smoothly moving target.   Optokinetic nystagmus testing was normal at 40 d/s. This full-field OPK test is to evaluate the ability of central nervous system to stabilize vision during sustained head movement after the VOR system loses effectiveness.   Michael-Hallpike testing was normal. Of note, patient reported slight vertigo/spinning sensation  in the head left position. Symptoms were brief and minimal. Intermittent right-beating nystagmus present. Nystagmus reduced with fixation light. Patient did not report symptoms of dizziness. Not clinically significant finding or indicative of true BPPV. Providence-Hallpike testing is to provide a diagnosis of Benign Paroxysmal Positional Vertigo (BPPV) of the vertical semicircular canals on the side which is most affected.  Roll testing was normal. Roll testing is to provide a diagnosis of Benign Paroxysmal Positional Vertigo (BPPV) of the horizontal semicircular canals on the side which is most affected.  Positional testing was normal. Of note, persistent low velocity (2 d/s) right-beating nystagmus present in the head left position. Nystagmus reduced with fixation light. Patient did not report symptoms of dizziness. Not clinically significant finding.  Positional testing is to evaluate patient's ability to hold a steady gaze while in different positions.  Bithermal caloric testing was borderline abnormal. Unilateral weakness of 22% to the left which is borderline abnormal and a directional preponderance of 6% to the right which is normal. Caloric testing is to evaluate for peripheral vestibular lesion.      VIDEO HEAD IMPULSE TEST (vHIT)  The vHIT procedure provides objective assessment of the high frequency vestibulo-ocular reflex (VOR) for each semicircular canal. Rapid, random horizontal and vertical thrusts are applied to the patient's head to provoke the VOR. The vHIT procedure includes two separate paradigms: Head Impulse Paradigm (HIMP) and Suppression Head Impulse Paradigm (SHIMP). SHIMP is an optional paradigm that is not appropriate to perform for every patient. However, it is appropriate to perform SHIMP when there is verified evidence of possible vestibulopathy in the traditional HIMP test.     Head Impulse Paradigm (HIMP)   Right Ear   Canal Gain Overt Saccades Covert Saccades   Lateral 1.06 absent absent    Anterior 1.02 absent absent   Posterior 1.72* absent absent        Head Impulse Paradigm (HIMP)   Left Ear   Canal Gain Overt Saccades Covert Saccades   Lateral 1.06 absent absent   Anterior 1.69* absent absent   Posterior 1.16 absent absent     Total gain for all canals tested were within normal limits (<0.80 is abnormal for lateral, <0.70 is abnormal for vertical).  There was no evidence of overt or covert saccades throughout testing. *Of note abnormally high gain value likely artifact from camera weight.      CERVICAL VESTIBULAR EVOKED MYOGENIC POTENTIALS (cVEMP):  The cVEMP procedure is an evoked potential used to test the saccule and its afferent pathway. An asymmetry ratio is utilized to determine side of lesion. The cVEMP was recorded with the patient cervical extension to produce isolated contraction of the ipsilateral sternocleidomastoid (SCM) muscle. The cVEMP was recorded using a 500 Hz tone burst or 1000 Hz tone burst at a rate of 5.1.      Ear Presentation Level Amplitude P1 Latency N1 Latency  Amplitude Asymmetry Ratio   Right 95 dB nHL 92.32 µV 16.33 ms 24.67 ms 7%   Left 95 dB nHL 80.35 µV 16.00 ms 24.00 ms       Replicable cVEMP responses were within normal limits, bilaterally. The amplitude asymmetry ratio of 7% was not significant (>33% = abnormal).     Superior Canal Dehiscence Screening (75 dB nHL): Negative bilaterally        OCULAR VESTIBULAR EVOKED MYOGENIC POTENTIALS (oVEMP)  The oVEMP procedure is an evoked potential used to test the utricle and its afferent pathway. An asymmetry ratio is utilized to determine side of lesion. This is a contralateral recording. The oVEMP was recorded with the patient seated upright with eyes tilted upward to produce isolated contraction of the contralateral inferior oblique muscle. The oVEMP were recorded using a 500 Hz, 2000 Hz, 4000 Hz tone burst at a rate of 5.1.       Ear Presentation Level Amplitude N1 Latency  P1 Latency  Amplitude Asymmetry Ratio    Right 100 dB nHL 5.24 µV 13.67 ms 16.67 ms Could not calculate due to threshold difference.   Left 95 dB nHL 3.50 µV 14.00 ms 17.00 ms       Replicable oVEMP responses were recorded, bilaterally. The amplitude asymmetry ratio could not be calculated due to threshold difference.     Meniere's Disease Screening (2000 Hz): Negative bilaterally  Superior Canal Dehiscence Screening (4000 Hz): Negative bilaterally      Testing and interpretation of results completed by Yesi Jamison CCC-A CCKALA. It was my pleasure to evaluate this patient.       Yesi Jamison, CCC-A CCVR  Senior Clinical Vestibular Audiologist

## 2024-03-13 ENCOUNTER — APPOINTMENT (OUTPATIENT)
Dept: OTOLARYNGOLOGY | Facility: CLINIC | Age: 54
End: 2024-03-13
Payer: COMMERCIAL

## 2024-03-13 ENCOUNTER — APPOINTMENT (OUTPATIENT)
Dept: AUDIOLOGY | Facility: CLINIC | Age: 54
End: 2024-03-13
Payer: COMMERCIAL

## 2024-03-20 ENCOUNTER — APPOINTMENT (OUTPATIENT)
Dept: NEUROLOGY | Facility: CLINIC | Age: 54
End: 2024-03-20
Payer: COMMERCIAL

## 2024-03-21 ENCOUNTER — TELEMEDICINE (OUTPATIENT)
Dept: OTOLARYNGOLOGY | Facility: CLINIC | Age: 54
End: 2024-03-21
Payer: COMMERCIAL

## 2024-03-21 DIAGNOSIS — H93.8X2 SENSATION OF FULLNESS IN LEFT EAR: ICD-10-CM

## 2024-03-21 DIAGNOSIS — R42 DIZZINESS: Primary | ICD-10-CM

## 2024-03-21 DIAGNOSIS — H83.2X2 VESTIBULAR HYPOFUNCTION, LEFT: ICD-10-CM

## 2024-03-21 DIAGNOSIS — H93.12 TINNITUS OF LEFT EAR: ICD-10-CM

## 2024-03-21 DIAGNOSIS — H90.3 SENSORINEURAL HEARING LOSS (SNHL) OF BOTH EARS: ICD-10-CM

## 2024-03-21 PROCEDURE — 99213 OFFICE O/P EST LOW 20 MIN: CPT | Performed by: NURSE PRACTITIONER

## 2024-03-21 RX ORDER — TRIAMTERENE/HYDROCHLOROTHIAZID 37.5-25 MG
0.5 TABLET ORAL DAILY
Qty: 45 TABLET | Refills: 0 | Status: SHIPPED | OUTPATIENT
Start: 2024-03-21 | End: 2024-05-02 | Stop reason: WASHOUT

## 2024-03-21 NOTE — PROGRESS NOTES
"Subjective   Patient ID: Ramirez Lazo \"Kamla" is a 53 y.o. male who presents for No chief complaint on file..  HPI  Patient scheduled for virtual visit to review manage testing results.  Provider did not have a camera so visit was done as a telephone only visit.  Verbal consent was obtained.    In review, he initially saw me 1/30/2024 for complaints of fluctuating left-sided fullness, tinnitus and lightheadedness.  At the time, his audiogram showed bilateral high-pitched sensorineural hearing loss.  A few weeks after his initial visit he contacted my office with complaints of worsening hearing loss in the left ear.  He was scheduled for an audiogram the following day, but as he was driving to the audiogram his left hearing returned to baseline and his audiogram remained unchanged.  He had balance function testing done 2/20/2024 which showed a borderline left vestibular hypofunction.  Today, he endorses ongoing left-sided fluctuating hearing loss, fullness, tinnitus.  He reports history of vertiginous episodes that were brief as well as intermittent episodes of lightheadedness typically triggered by getting out of bed, standing up from a chair, bending forward.  He monitors his blood pressures regularly and typically runs in the 120s over 70s.  Review of Systems  A comprehensive or 10 points review of the patient´s constitutional, neurological, HEENT, pulmonary, cardiovascular and genito-urinary systems showed only those mentioned in history of present illness.    Objective   Physical Exam  Deferred as this was a telephone only visit  Assessment/Plan     This patient presents for subsequent evaluation of chronic acquired dizziness, left aural fullness, left subjective tinnitus, left vestibular hypofunction, and bilateral sensorineural hearing loss.    We reviewed in detail the etiology and symptoms associated with Ménière's disease.  His description of fluctuating left-sided fullness, tinnitus, hearing loss is " consistent with Ménière's disease.  However, his pattern of high-pitched sensorineural hearing loss and complaints of lightheadedness is not consistent with Ménière's disease.  We discussed that he may have a milder form versus an atypical presentation of Ménière's disease.  I recommended dietary modifications and diuretic therapy.  Since he currently experiences lightheadedness and his blood pressures are not routinely elevated, I recommended starting with Maxide 1/2 tablet daily.  He will have his electrolytes checked in 2 weeks after starting diuretic therapy.  Given the asymmetric symptoms and left vestibular hypofunction, I also recommended MRI IAC with and without contrast to further evaluate for an acoustic neuroma.  Patient will be scheduled for a follow-up virtual visit in 2 months.  If he has any worsening of symptoms or develops true vertigo, I asked that he contact my office.  All questions were answered to patient's satisfaction.      30 minutes was spent on this patient´s visit. More than 50% of that time was spent in counseling regarding the possible etiologies, test results, treatment options and coordinating care.    This note was created using speech recognition transcription software. Despite proofreading, several typographical errors might be present that might affect the meaning of the content. Please call with any questions.         MARIA GUADALUPE Gardiner-CNP 03/21/24 11:16 AM

## 2024-04-08 ENCOUNTER — HOSPITAL ENCOUNTER (OUTPATIENT)
Dept: RADIOLOGY | Facility: HOSPITAL | Age: 54
Discharge: HOME | End: 2024-04-08
Payer: COMMERCIAL

## 2024-04-08 DIAGNOSIS — H83.2X2 VESTIBULAR HYPOFUNCTION, LEFT: ICD-10-CM

## 2024-04-08 DIAGNOSIS — R42 DIZZINESS: ICD-10-CM

## 2024-04-08 DIAGNOSIS — H93.8X2 SENSATION OF FULLNESS IN LEFT EAR: ICD-10-CM

## 2024-04-08 DIAGNOSIS — H93.12 TINNITUS OF LEFT EAR: ICD-10-CM

## 2024-04-08 PROCEDURE — 2550000001 HC RX 255 CONTRASTS: Performed by: NURSE PRACTITIONER

## 2024-04-08 PROCEDURE — 70553 MRI BRAIN STEM W/O & W/DYE: CPT | Performed by: RADIOLOGY

## 2024-04-08 PROCEDURE — 70553 MRI BRAIN STEM W/O & W/DYE: CPT

## 2024-04-08 PROCEDURE — A9575 INJ GADOTERATE MEGLUMI 0.1ML: HCPCS | Performed by: NURSE PRACTITIONER

## 2024-04-08 RX ORDER — GADOTERATE MEGLUMINE 376.9 MG/ML
0.2 INJECTION INTRAVENOUS
Status: COMPLETED | OUTPATIENT
Start: 2024-04-08 | End: 2024-04-08

## 2024-04-08 RX ADMIN — GADOTERATE MEGLUMINE 28 ML: 376.9 INJECTION INTRAVENOUS at 19:16

## 2024-04-24 ENCOUNTER — OFFICE VISIT (OUTPATIENT)
Dept: NEUROLOGY | Facility: CLINIC | Age: 54
End: 2024-04-24
Payer: COMMERCIAL

## 2024-04-24 VITALS
SYSTOLIC BLOOD PRESSURE: 132 MMHG | WEIGHT: 315 LBS | BODY MASS INDEX: 39.5 KG/M2 | HEART RATE: 80 BPM | RESPIRATION RATE: 16 BRPM | DIASTOLIC BLOOD PRESSURE: 86 MMHG

## 2024-04-24 DIAGNOSIS — R55 PRE-SYNCOPE: Primary | ICD-10-CM

## 2024-04-24 PROCEDURE — 99204 OFFICE O/P NEW MOD 45 MIN: CPT | Performed by: NURSE PRACTITIONER

## 2024-04-24 ASSESSMENT — PATIENT HEALTH QUESTIONNAIRE - PHQ9
SUM OF ALL RESPONSES TO PHQ9 QUESTIONS 1 AND 2: 0
2. FEELING DOWN, DEPRESSED OR HOPELESS: NOT AT ALL
1. LITTLE INTEREST OR PLEASURE IN DOING THINGS: NOT AT ALL

## 2024-04-24 ASSESSMENT — ENCOUNTER SYMPTOMS
LOSS OF SENSATION IN FEET: 0
OCCASIONAL FEELINGS OF UNSTEADINESS: 1

## 2024-04-24 NOTE — PATIENT INSTRUCTIONS
#Referral to cardiology for pre-syncope--your symptoms do not point to a neurological condition    Please call [phone] ([phone]) to schedule an apt.     #Continue to follow up with ENT    #Follow up here as needed      Ty Martinez, NP-C  Adult/Gerontological Nurse Practitioner   [facility], Department of Neurology  [facility]  [address] [place] [address]  Phone: [phone]  Fax: [phone]

## 2024-04-24 NOTE — PROGRESS NOTES
"Subjective     Ramirez Lazo is a  53 y.o. year old male who presents with Dizziness.   Visit type: new patient visit     Lightheadedness with position changes and ringing are very bothersome.    December 2023 he had L shoulder surgery, he had uncontrollable coughing under general anethesia, after surgery everything was OK. Had a severe headache after surgery that lasted a day or 2 and solid red eyes for weeks after surgery--eyes were not painful.    First week of January 8pm at night, sat down to watch TV and instantly his ears felt full \"like swimmers ear\", loss of hearing instantly. He has always had sutble tinnitus for years but this was severe, very loud ringing in the ears, felt like his ears needed to pop, since then has been continuous and loud.  Hearing loss comes and goes. Ear clogging comes and goes, used to be days solid. Not as much or as long.  Ringing regardless is loud and constant.    Went to PCP,had wax removal, antibiotic ear drops.     1 week later ENT-nothing obvious, hearing test with hearing loss in both ears. Followed a migraine diet--removed food triggers. Hearing loss/muffling worsened and scheduled a test that day and by the time he got there his muffled feeling.     Sx in both ears, sometimes worse in Left, fullness, clogging, tinnitus and hearing loss in both ears.  Pitch: higher pitch (than his old tinnitus he had at baseline), like a smooth vibration, sometimes can go up and down a little but overall tight frequency--difficult to explain.  No times of the day it is worse though at times worse than others.     Lightheaded/dizzy every time he bends or squats down and gets back up or laying down to standing will trigger it. Has to hold onto something Doing that several times worsens it. No lightheadedness in between. Lasts seconds---\"purely lightheadedness\", has to hold on, feels he will pass out, spinning more of a dizziness, pressure in head.   He had to lay 30mins for his MRI and was " [id number]       "very dizzy and lightheaded, pressure in his head. He does not usually lay on his back.  No syncope    Laying flat, bending or squatting can trigger the dizziness, none in between.    If he lays all night and gets up in the am he is OK but laying 5 mins he gets lightheaded. Does not lay on his back.     He refers to a sensation as vertigo that started in January--standing at the counter it felt he was on a trampoline, he thought there was an earthquake, lasted 3-4 seconds once a day--no obvious trigger --no dizziness, felt woozy like on a boat moving. Every other day to a few times a week and has improved to 1-2 times a day.     Balance: denies issues  Falls: none    Headache this week laid down on his stomach, had dizziness and head pressue, took Tylenol and went away, denies n/v, P/P with it. Currently having once a week. Does not last long and worse with movement (laying down)    Used to get migraines 25 years ago he would have once a month and ocasioanlly over the years (1-2 a year photophobia, wavy line he sees and would last 3 days, would have to lay down).    Denies double vision or vision changes    Had 1 surgery 2 weeks ago on R shoulder and did well.    Nasal congestion he had at the begining of stuffiness in his nose.     Treatment  Tried meclizine--no difference  Maxzide x 1 month did not change tinnitus or lightheadedness, maybe helped ear fullness because not as constant?    Consults:  ENT (see below)  Denies seeing other neurologist       MRI IAC WOW contrast 4/8/24:  IMPRESSION:  1.   Asymmetric enhancement in the left IAC best appreciated on the  volumetric images of the head and neck and not confirmed on dedicated  high-resolution imaging of the IAC's is favored to be vascular in  nature. An intracanalicular mass is thought to be less likely.  2.  Unremarkable MRI of the brain.    ENT CNP Means 3/21/24 \"audiogram showed bilateral high-pitched sensorineural hearing loss. A few weeks after his initial " "visit he contacted my office with complaints of worsening hearing loss in the left ear. He was scheduled for an audiogram the following day, but as he was driving to the audiogram his left hearing returned to baseline and his audiogram remained unchanged. He had balance function testing done 2/20/2024 which showed a borderline left vestibular hypofunction\"  \"we discussed that he may have a milder form versus an atypical presentation of Ménière's disease. I recommended dietary modifications and diuretic therapy. Since he currently experiences lightheadedness and his blood pressures are not routinely elevated, I recommended starting with Maxide 1/2 tablet daily. He will have his electrolytes checked in 2 weeks after starting diuretic therapy. Given the asymmetric symptoms and left vestibular hypofunction, I also recommended MRI IAC with and without contrast to further evaluate for an acoustic neuroma. Patient will be scheduled for a follow-up virtual visit in 2 months. If he has any worsening of symptoms or develops true vertigo, I asked that he contact my office. All questions were answered to patient's satisfaction.\"      Other PMH/SH  Denies hx of neurological conditions, brain surgery, brain injury, no cancers    Off for shoulder surgery (R torn bicep and rotator repair, L rotator cuff)  Employed as Sales--high stress job, 29 y.o with autism, caring for his mother with terminal esophageal cancer (worse last year when in and out of the hospital), father has parkinsonism  Lives with Fiance  He does not feel overly stressed, \"rolls with the punches\", trying to focus some on himself--mediation, Reiki, \"more at peace than ever\"  Denies depression.     Denies DM, Monroxyuro for weight loss.          There is no problem list on file for this patient.     History reviewed. No pertinent past medical history.   History reviewed. No pertinent surgical history.   Social History     Socioeconomic History    Marital status: " Single     Spouse name: Not on file    Number of children: Not on file    Years of education: Not on file    Highest education level: Not on file   Occupational History    Not on file   Tobacco Use    Smoking status: Never    Smokeless tobacco: Current     Types: Snuff   Vaping Use    Vaping status: Never Used   Substance and Sexual Activity    Alcohol use: Yes     Comment: Rarely    Drug use: Never    Sexual activity: Not on file   Other Topics Concern    Not on file   Social History Narrative    Not on file     Social Determinants of Health     Financial Resource Strain: Not on file   Food Insecurity: Not on file   Transportation Needs: Not on file   Physical Activity: Not on file   Stress: Not on file   Social Connections: Not on file   Intimate Partner Violence: Not on file   Housing Stability: Not on file      No family history on file.   Patient Health Questionnaire-2 Score: 0          Review of Systems  All other system have been reviewed and are negative for complaint.  Objective   [facility] Exam    Visit Vitals  /86 (BP Location: Left arm, Patient Position: Sitting, BP Cuff Size: Large adult)   Pulse 80   Resp 16   Wt 143 kg (316 lb)   BMI 39.50 kg/m²   Smoking Status Never   BSA 2.75 m²         Current Outpatient Medications:     esomeprazole magnesium 20 mg tablet,delayed release (DR/EC), Take by mouth., Disp: , Rfl:     Mounjaro 2.5 mg/0.5 mL pen injector, Inject under the skin., Disp: , Rfl:     multivit-min/ferrous fumarate (MULTI VITAMIN ORAL), Take by mouth., Disp: , Rfl:     TADALAFIL ORAL, Take by mouth., Disp: , Rfl:     triamterene-hydrochlorothiazid (Maxzide-25mg) 37.5-25 mg tablet, Take 0.5 tablets by mouth once daily., Disp: 45 tablet, Rfl: 0       General:  Well developed well-nourished male in no acute distress with good grooming.  Mental Status:  The patient is awake, alert and oriented to time, place and person.  Grossly normal fund of knowledge noted.  Recent and remote memory  intact.  Attention span and concentration is within normal limits.   Language:  Speech is clear, language is intact.   Cranial Nerves:  Extraocular muscles are intact.  Visual fields are intact by confrontation.  Cranial nerve 5 and 7 are symmetric bilaterally.  Hearing is intact to voice. Palate elevates symmetrically.  Tongue is midline.  Shoulder shrug is intact.   Motor Exam:  Shows symmetric strength in the upper and lower extremities bilaterally both proximally and distally with normal tone and bulk. Muscle strength 5/5 throughout.   Deep Tendon Reflexes:  Are intact throughout BUE-0 and BLE-0  Cerebellar Exam:  Shows no dysmetria or truncal ataxia.  Gait and Station:  Is within normal limits except decreased arm swing(likely d/t shoulder pain/surgeries)  Able to walk in tandem.  No resting or action or postural tremor, no rigidity, no bradykinesia.  No dystonia    No vestibular hypofunction per Dr. Lopes's assessment      Assessment/Plan   Diagnoses and all orders for this visit:  Pre-syncope  -     Referral to Cardiology; Future      Ramirez Lazo is a 53 y.o. year old male here for Lightheadedness with position changes and ringing that occurred after surgery he had on L shoulder in December. His main concerns are more of an imbalance/woozy/lightheaded feeling with position changes, not an actual vertigo, no vision changes and no nystagmus on exam, no migrainous symptoms (nausea, vomiting, P/P) to suggest central vestibular issues.     Diagnosis is pre-syncope for what sounds like hemodynamic issues (position changes causing symptoms of wooziness/dizziness without central vestibular issues), so will send to cardiologist for full workup like heart monitor, BP monitoring, echocardiogram, etc.    To follow up possible Meniere's disease and abnormal MRI IAC with ENT.     Dr. Lopes immediately present in office suite to discuss HPI, plan of care and exam patient for plan.     #Referral to cardiology for  pre-syncope--your symptoms do not point to a neurological condition    Please call [phone] ([phone]) to schedule an apt.     #Continue to follow up with ENT    #Follow up here as needed        I, VIMAL Martinez, personally performed  a medically appropriate exam, discussion of care/treatment options taking a total time of 46 minutes for today's visit including time to review prior consults/imaging/ER visits related to diagnosis and discussion of plan of care with MD.      Ty Martinez, VIMAL-C  Adult/Gerontological Nurse Practitioner   [facility], Department of Neurology  [facility] [place]  [facility]  [id number] [place] [address]  Phone: [phone]  Fax: [phone]

## 2024-05-02 ENCOUNTER — OFFICE VISIT (OUTPATIENT)
Dept: CARDIOLOGY | Facility: CLINIC | Age: 54
End: 2024-05-02
Payer: COMMERCIAL

## 2024-05-02 VITALS
DIASTOLIC BLOOD PRESSURE: 69 MMHG | WEIGHT: 312 LBS | HEIGHT: 75 IN | HEART RATE: 105 BPM | BODY MASS INDEX: 38.79 KG/M2 | SYSTOLIC BLOOD PRESSURE: 104 MMHG

## 2024-05-02 DIAGNOSIS — I10 ESSENTIAL HYPERTENSION: Primary | ICD-10-CM

## 2024-05-02 DIAGNOSIS — R55 PRE-SYNCOPE: ICD-10-CM

## 2024-05-02 DIAGNOSIS — R55 POSTURAL DIZZINESS WITH NEAR SYNCOPE: ICD-10-CM

## 2024-05-02 DIAGNOSIS — R42 POSTURAL DIZZINESS WITH NEAR SYNCOPE: ICD-10-CM

## 2024-05-02 PROCEDURE — 3074F SYST BP LT 130 MM HG: CPT | Performed by: INTERNAL MEDICINE

## 2024-05-02 PROCEDURE — 99214 OFFICE O/P EST MOD 30 MIN: CPT | Performed by: INTERNAL MEDICINE

## 2024-05-02 PROCEDURE — 3078F DIAST BP <80 MM HG: CPT | Performed by: INTERNAL MEDICINE

## 2024-05-02 PROCEDURE — 99204 OFFICE O/P NEW MOD 45 MIN: CPT | Performed by: INTERNAL MEDICINE

## 2024-05-02 RX ORDER — FLUDROCORTISONE ACETATE 0.1 MG/1
0.1 TABLET ORAL DAILY
Qty: 30 TABLET | Refills: 11 | Status: SHIPPED | OUTPATIENT
Start: 2024-05-02 | End: 2025-05-02

## 2024-05-02 NOTE — PROGRESS NOTES
"Chief Complaint:   Dizziness     History of Present Illness     Ramirez Woodson" is a 53 y.o. male presenting with for evaluation of syncope.  Approximately for the past 4 mos,  Ramirez Woodson" experienced lost hearing acutely with losing 1/2 his hearing with worsening tinnitus and has had chronic tinnitus and dizziness since then.  Has had constant ringing in his ears, ear fills up once per week, and has had constant dizziness which he describes as light headed with standing, resolved after a few seconds.  All postural changes. Never had syncope. No vertigo.  No nausea. There was no prodromal palpitations, chest discomfort, shortness of breath, sweating, nausea, vomiting, pain, or emotional stress.  There is no history of coronary artery disease, heart failure, myocardial infarction, left ventricular dysfunction, or atrial fibrillation.  There is no family history of primary electrical cardiac disorders or sudden cardiac death.  An ECG does not demonstrate evidence of long QT syndrome, Brugada syndrome, or Clint-Parkinson-White syndrome.  ED evaluation negative.  Neurology evaluation.  No Hx syncope.    Review of Systems  All pertinent systems have been reviewed and are negative except for what is stated in the history of present illness.    All other systems have been reviewed and are negative and noncontributory to this patient's current ailments.  .       Previous History     Past Medical History:  He has a past medical history of Essential hypertension (05/02/2024) and Postural dizziness with near syncope (05/02/2024).    Past Surgical History:  He has no past surgical history on file.      Social History:  He reports that he has never smoked. His smokeless tobacco use includes snuff. He reports current alcohol use. He reports that he does not use drugs.    Family History:  No family history on file.     Allergies:  Patient has no known allergies.    Outpatient Medications:  Current Outpatient " "Medications   Medication Instructions    esomeprazole magnesium 20 mg tablet,delayed release (DR/EC) oral    Mounjaro 2.5 mg/0.5 mL pen injector subcutaneous    multivit-min/ferrous fumarate (MULTI VITAMIN ORAL) oral    TADALAFIL ORAL oral       Physical Examination   Vitals:  Visit Vitals  /69 (BP Location: Right arm, Patient Position: Sitting, BP Cuff Size: Large adult)   Pulse 105   Ht 1.905 m (6' 3\")   Wt 142 kg (312 lb)   BMI 39.00 kg/m²   Smoking Status Never   BSA 2.74 m²    Physical Exam  Vitals reviewed.   Constitutional:       General: He is not in acute distress.     Appearance: Normal appearance.   HENT:      Head: Normocephalic and atraumatic.      Nose: Nose normal.   Eyes:      Conjunctiva/sclera: Conjunctivae normal.   Cardiovascular:      Rate and Rhythm: Normal rate and regular rhythm.      Pulses: Normal pulses.      Heart sounds: No murmur heard.  Pulmonary:      Effort: Pulmonary effort is normal. No respiratory distress.      Breath sounds: Normal breath sounds. No wheezing, rhonchi or rales.   Abdominal:      General: Bowel sounds are normal. There is no distension.      Palpations: Abdomen is soft.      Tenderness: There is no abdominal tenderness.   Musculoskeletal:         General: No swelling.      Right lower leg: No edema.      Left lower leg: No edema.   Skin:     General: Skin is warm and dry.      Capillary Refill: Capillary refill takes less than 2 seconds.   [facility]:      General: No focal deficit present.      Mental Status: He is alert.   Psychiatric:         Mood and Affect: Mood normal.              Labs/Imaging/Cardiac Studies     Last Labs:  CBC -  Lab Results   Component Value Date    WBC 7.6 06/19/2021    HGB 15.3 06/19/2021    HCT 43.4 06/19/2021    MCV 86 06/19/2021     06/19/2021       CMP -  Lab Results   Component Value Date    CALCIUM 9.4 06/19/2021       LIPID PANEL -   No results found for: \"CHOL\", \"HDL\", \"CHHDL\", \"LDL\", \"VLDL\", \"TRIG\", " "\"NHDL\"    RENAL FUNCTION PANEL -   Lab Results   Component Value Date    K 3.6 06/19/2021       No results found for: \"BNP\", \"HGBA1C\"    ECG:    Echo:  No echocardiogram results found for the past 12 months       Assessment and Recommendations     Assessment/Plan   1. Pre-syncope  He has orthostatic symptoms of light headedness and near-syncope with low BP today.  May have been worsened by weight loss with Mounjaro. He may have started with an inner ear cause of vertigo and/or Meniere's acutely but now clearly has postural symptoms and nothing to suggest he has vertigo or Meniere's. Trial of Florinef.  Refer to PT for vestibular therapy as he still has tinnitus.  - Referral to Cardiology           Sha Rios MD    Exclusive of any other services or procedures performed, I, Sha Rios MD , spent 30 minutes in duration for this visit today.  This time consisted of chart review, obtaining history, and/or performing the exam as documented above as well as documenting the clinical information for the encounter in the electronic record, discussing treatment options, plans, and/or goals with patient, family, and/or caregiver, refilling medications, updating the electronic record, ordering medicines, lab work, imaging, referrals, and/or procedures as documented above and communicating with other [facility] professionals. I have discussed the results of laboratory, radiology, and cardiology studies with the patient and their family/caregiver.    "

## 2024-05-06 ENCOUNTER — TELEPHONE (OUTPATIENT)
Dept: OTOLARYNGOLOGY | Facility: CLINIC | Age: 54
End: 2024-05-06
Payer: COMMERCIAL

## 2024-05-06 DIAGNOSIS — R42 DIZZINESS: Primary | ICD-10-CM

## 2024-05-06 NOTE — TELEPHONE ENCOUNTER
On dietary modifications for left meniere's, patient reports improvement with left fullness, but tinnitus and lightheadedness is unchanged. He saw cardiology who recommended increasing his salt intake.  We also discussed that I reviewed his MRI with Dr. Wei.  There was a very small area of enhancement in the left IAC which appears to be more vascular.  We recommended repeating his MRI in 1 year.  Patient is in agreement with the plan.  All questions were answered to patient's satisfaction.    This note was created using speech recognition transcription software. Despite proofreading, several typographical errors might be present that might affect the meaning of the content. Please call with any questions.

## 2024-05-14 ENCOUNTER — CLINICAL SUPPORT (OUTPATIENT)
Dept: PHYSICAL THERAPY | Facility: CLINIC | Age: 54
End: 2024-05-14
Payer: COMMERCIAL

## 2024-05-14 DIAGNOSIS — R55 POSTURAL DIZZINESS WITH NEAR SYNCOPE: Primary | ICD-10-CM

## 2024-05-14 DIAGNOSIS — R42 POSTURAL DIZZINESS WITH NEAR SYNCOPE: Primary | ICD-10-CM

## 2024-05-14 DIAGNOSIS — I10 ESSENTIAL HYPERTENSION: ICD-10-CM

## 2024-05-14 DIAGNOSIS — R55 PRE-SYNCOPE: ICD-10-CM

## 2024-05-14 PROCEDURE — 97110 THERAPEUTIC EXERCISES: CPT | Mod: GP | Performed by: PHYSICAL THERAPIST

## 2024-05-14 PROCEDURE — 97161 PT EVAL LOW COMPLEX 20 MIN: CPT | Mod: GP | Performed by: PHYSICAL THERAPIST

## 2024-05-14 ASSESSMENT — PATIENT HEALTH QUESTIONNAIRE - PHQ9
1. LITTLE INTEREST OR PLEASURE IN DOING THINGS: NOT AT ALL
2. FEELING DOWN, DEPRESSED OR HOPELESS: NOT AT ALL
SUM OF ALL RESPONSES TO PHQ9 QUESTIONS 1 AND 2: 0

## 2024-05-14 ASSESSMENT — ENCOUNTER SYMPTOMS
OCCASIONAL FEELINGS OF UNSTEADINESS: 0
LOSS OF SENSATION IN FEET: 0
DEPRESSION: 0

## 2024-05-14 ASSESSMENT — COLUMBIA-SUICIDE SEVERITY RATING SCALE - C-SSRS: 1. IN THE PAST MONTH, HAVE YOU WISHED YOU WERE DEAD OR WISHED YOU COULD GO TO SLEEP AND NOT WAKE UP?: NO

## 2024-05-14 NOTE — PROGRESS NOTES
Physical Therapy Evaluation    Patient Name: Ramirez Lazo  MRN: [id number]  Today's Date: 5/14/2024  Insurance: [employer] advantage program  Visit: 1/30  Referred by: Dr. Sha Rios  Time Calculation  Start Time: 1710  Stop Time: 1820  Time Calculation (min): 70 min  Diagnosis:   1. Postural dizziness with near syncope          PT Evaluation Time Entry  PT Evaluation (Low) Time Entry: 60  PT Therapeutic Procedures Time Entry  Therapeutic Exercise Time Entry: 10                   PRECAUTIONS: Postural hypotension       SUBJECTIVE:   First week of January 2024, felt like his ears filled up with fluid, noticed tinnitus exacerbation from typical low level, with associated loss of hearing. Thought he had an ear infection, went to PCP and treated with ear drops and steroids for ear infection. Within a week or so, developed short lived vertigo (3-4 seconds) Self referred to [facility] and had ENT testing/VEMP test and found to have vestibular hypofunction of L ear. Found to have hearing loss of both ears with hearing tests. Has tried migraine diet for Meniere's with no significant change in symptoms. Had MRI IAC but no neuroma but some mild abnormality L side that will be monitored. Referred to a neurologist, felt the problem was cardiac related (pre-syncope) and referred to Dr. Rios, who referred him to PT for Meniere's and postural hypotension. States his BP has been running 113/70 average with home monitoring. Patient states his symptoms are getting better except for the ringing in his ears and lightheadedness with change of position of bending, sit to stand, squat to stand. Must pause for a few seconds after transfers.  Pain:   N/A  Rates vertigo at 8/10 at worst, 0/10 at best  Home Living: Lives with his fiance, no concerns with his home environment.     Prior level of function: Works in sales (stressful), mom has terminal cancer and dad has Parkinson's, learning meditation and stress relief. Enjoys  fishing/boating and riding motorcycle, gardening and yard work. Currently slow and cautious with transfers, avoiding bending activities.        OBJECTIVE:  Oculomotor Exam:    VOR horizontal- WNL's    VOR vertical- WNL's    Smooth pursuits- WNL's    Saccades    NPC (Conversion) <6 cm is normal- Normal    VSR Vestibular spinal reflex (head w/ eye movement)- WNL's    Cervical AROM: All movements are WNL's and do not provoke vestibular symptoms  Flexion  Extension  SB R  SB L  ROT R  ROT L    Palpation:  Negative TTP anterior and posterior cervical    Balance:  Static Standing balance -Good with manual challenge x 4 directions    SLS R   30 sec   SLS L 30 sec    Gait: WNL's    Motion Sensitivity:  R sidly test Neg  L sidly test Neg      Franklin Hallpike:   R    NT     L  NT  Roll test :  R    NT    L  NT    DHI:   Outcome Measure:  Other Measures  Dizziness Handicap Inventory: 22/100    TREATMENT:  - Therex:   Access Code: [email or web address]  URL: [email or web address]  Date: 05/14/2024  Prepared by: Kyleigh Chambers    Exercises  - Seated to Fold Over Vestibular Habituation  - 3 x daily - 7 x weekly - 1 sets - 10 reps - 3 sec hold  - Sit to Stand - Increased Speed  - 3 x daily - 7 x weekly - 1 sets - 5-10 reps - 5 hold    PATIENT EDUCATION:  Outpatient Education  Individual(s) Educated: Patient  Education Provided: Anatomy, Home Exercise Program  Risk and Benefits Discussed with Patient/Caregiver/Other: yes  Patient/Caregiver Demonstrated Understanding: yes  Plan of Care Discussed and Agreed Upon: yes  Patient Response to Education: Patient/Caregiver Verbalized Understanding of InformationAnatomy- Auditory vs vestibular function of inner ear.  ASSESSMENT:  Patient is a 52 yo male presents with primary complaint of lightheadedness reproduced with vertical changes in position. Vestibular, balance and positional tests are unremarkable, with only slight reproduction of lightheadedness rated at 1/10 with R sidly to  sit and sit to stand. Patient would benefit from skilled PT to address vestibular system with habituation activities.    PT Assessment Results:  (Vertigo, vestibular hypofunction)  Rehab Prognosis: Good  Evaluation/Treatment Tolerance: Patient tolerated treatment well  Strengths: Ability to acquire knowledgeLow complexity due to patient's clinical presentation being stable and uncomplicated by any significant comorbidities that may affect rehab tolerance and progression.     Clinical presentation:  Stable and/or uncomplicated characteristics,     PLAN:   Treatment/Interventions: Canalith repositioning, Education/ Instruction, Neuromuscular re-education, Other (comment) (habituation exercises)  PT Plan: Skilled PT  PT Frequency: Other (Comment)  Duration: prn- every 1-2 weeks  Onset Date: 01/04/24  Number of Treatments Authorized: 30  Rehab Potential: Good  Plan of Care Agreement: Patient    GOALS:  Active       PT Problem       Patient will report vertigo/lightheadedness at 0-1/10 with vertical transfers       Start:  05/14/24    Expected End:  07/13/24            Patient will return to normalized speed of transfers sit to stand and with flexion ADL's       Start:  05/14/24    Expected End:  07/13/24            Patient will score <10/100 on DHI       Start:  05/14/24    Expected End:  07/13/24            Patient will be independent with home exercise and self management for long term wellness       Start:  05/14/24    Expected End:  07/13/24

## 2024-06-05 ENCOUNTER — APPOINTMENT (OUTPATIENT)
Dept: CARDIOLOGY | Facility: CLINIC | Age: 54
End: 2024-06-05
Payer: COMMERCIAL

## 2024-06-12 ENCOUNTER — HOSPITAL ENCOUNTER (OUTPATIENT)
Dept: CARDIOLOGY | Facility: CLINIC | Age: 54
Discharge: HOME | End: 2024-06-12
Payer: COMMERCIAL

## 2024-06-12 ENCOUNTER — OFFICE VISIT (OUTPATIENT)
Dept: CARDIOLOGY | Facility: CLINIC | Age: 54
End: 2024-06-12
Payer: COMMERCIAL

## 2024-06-12 VITALS
SYSTOLIC BLOOD PRESSURE: 122 MMHG | HEART RATE: 94 BPM | WEIGHT: 290 LBS | TEMPERATURE: 97.1 F | HEIGHT: 75 IN | DIASTOLIC BLOOD PRESSURE: 86 MMHG | BODY MASS INDEX: 36.06 KG/M2

## 2024-06-12 DIAGNOSIS — R55 PRE-SYNCOPE: ICD-10-CM

## 2024-06-12 DIAGNOSIS — R42 POSTURAL DIZZINESS WITH NEAR SYNCOPE: ICD-10-CM

## 2024-06-12 DIAGNOSIS — I10 ESSENTIAL HYPERTENSION: ICD-10-CM

## 2024-06-12 DIAGNOSIS — R55 POSTURAL DIZZINESS WITH NEAR SYNCOPE: ICD-10-CM

## 2024-06-12 LAB
AORTIC VALVE PEAK VELOCITY: 1.13 M/S
AV PEAK GRADIENT: 5.1 MMHG
AVA (PEAK VEL): 3.43 CM2
BODY SURFACE AREA: 2.64 M2
EJECTION FRACTION APICAL 4 CHAMBER: 82.6
LEFT ATRIUM VOLUME AREA LENGTH INDEX BSA: 22.6 ML/M2
LEFT VENTRICLE INTERNAL DIMENSION DIASTOLE: 4.26 CM (ref 3.5–6)
LEFT VENTRICULAR OUTFLOW TRACT DIAMETER: 2.28 CM
LV EJECTION FRACTION BIPLANE: 57 %
MITRAL VALVE E/A RATIO: 1.41
MITRAL VALVE E/E' RATIO: 5.94
RIGHT VENTRICLE FREE WALL PEAK S': 15 CM/S
RIGHT VENTRICLE PEAK SYSTOLIC PRESSURE: 21.2 MMHG
TRICUSPID ANNULAR PLANE SYSTOLIC EXCURSION: 1.8 CM

## 2024-06-12 PROCEDURE — 93306 TTE W/DOPPLER COMPLETE: CPT | Performed by: INTERNAL MEDICINE

## 2024-06-12 PROCEDURE — 3074F SYST BP LT 130 MM HG: CPT | Performed by: INTERNAL MEDICINE

## 2024-06-12 PROCEDURE — 99214 OFFICE O/P EST MOD 30 MIN: CPT | Performed by: INTERNAL MEDICINE

## 2024-06-12 PROCEDURE — 3079F DIAST BP 80-89 MM HG: CPT | Performed by: INTERNAL MEDICINE

## 2024-06-12 PROCEDURE — 93306 TTE W/DOPPLER COMPLETE: CPT

## 2024-06-12 RX ORDER — ALPRAZOLAM 0.5 MG/1
0.5 TABLET ORAL NIGHTLY PRN
COMMUNITY

## 2024-06-12 NOTE — PROGRESS NOTES
"Chief Complaint:   Dizziness     History of Present Illness     Ramirez Woodson" is a 53 y.o. male presenting with for evaluation of syncope.  Approximately for the past 4 mos,  Ramirez Woodson" experienced lost hearing acutely with losing 1/2 his hearing with worsening tinnitus and has had chronic tinnitus and dizziness since then.  Has had constant ringing in his ears, ear fills up once per week, and has had constant dizziness which he describes as light headed with standing, resolved after a few seconds.  All postural changes. Never had syncope. No vertigo.  No nausea. There was no prodromal palpitations, chest discomfort, shortness of breath, sweating, nausea, vomiting, pain, or emotional stress.  There is no history of coronary artery disease, heart failure, myocardial infarction, left ventricular dysfunction, or atrial fibrillation.  There is no family history of primary electrical cardiac disorders or sudden cardiac death.  An ECG does not demonstrate evidence of long QT syndrome, Brugada syndrome, or Clint-Parkinson-White syndrome.  ED evaluation negative.  Neurology evaluation.  No Hx syncope.  Last seen 5/2/24 and placed on Florinef.  No improvement in his symptoms - stopped.  Still having postural dizziness, fullness in ear is better, hearing reduction, and tinnitus.     Review of Systems  All pertinent systems have been reviewed and are negative except for what is stated in the history of present illness.     All other systems have been reviewed and are negative and noncontributory to this patient's current ailments.  .       Previous History     Past Medical History:  He has a past medical history of Essential hypertension (05/02/2024) and Postural dizziness with near syncope (05/02/2024).    Past Surgical History:  He has no past surgical history on file.      Social History:  He reports that he has never smoked. His smokeless tobacco use includes snuff. He reports current alcohol use. He " "reports that he does not use drugs.    Family History:  No family history on file.     Allergies:  Patient has no known allergies.    Outpatient Medications:  Current Outpatient Medications   Medication Instructions    ALPRAZolam (XANAX) 0.5 mg, oral, Nightly PRN    esomeprazole magnesium 20 mg tablet,delayed release (DR/EC) oral    Mounjaro 2.5 mg/0.5 mL pen injector subcutaneous    multivit-min/ferrous fumarate (MULTI VITAMIN ORAL) oral       Physical Examination   Vitals:  Visit Vitals  /86 (BP Location: Left arm)   Pulse 94   Temp 36.2 °C (97.1 °F)   Ht 1.905 m (6' 3\")   Wt 132 kg (290 lb)   BMI 36.25 kg/m²   Smoking Status Never   BSA 2.64 m²    Physical Exam  Vitals reviewed.   Constitutional:       General: He is not in acute distress.     Appearance: Normal appearance.   HENT:      Head: Normocephalic and atraumatic.      Nose: Nose normal.   Eyes:      Conjunctiva/sclera: Conjunctivae normal.   Cardiovascular:      Rate and Rhythm: Normal rate and regular rhythm.      Pulses: Normal pulses.      Heart sounds: No murmur heard.  Pulmonary:      Effort: Pulmonary effort is normal. No respiratory distress.      Breath sounds: Normal breath sounds. No wheezing, rhonchi or rales.   Abdominal:      General: Bowel sounds are normal. There is no distension.      Palpations: Abdomen is soft.      Tenderness: There is no abdominal tenderness.   Musculoskeletal:         General: No swelling.      Right lower leg: No edema.      Left lower leg: No edema.   Skin:     General: Skin is warm and dry.      Capillary Refill: Capillary refill takes less than 2 seconds.   [facility]:      General: No focal deficit present.      Mental Status: He is alert.   Psychiatric:         Mood and Affect: Mood normal.             Labs/Imaging/Cardiac Studies     Last Labs:  CBC -  Lab Results   Component Value Date    WBC 7.6 06/19/2021    HGB 15.3 06/19/2021    HCT 43.4 06/19/2021    MCV 86 06/19/2021     06/19/2021 " "      CMP -  Lab Results   Component Value Date    CALCIUM 9.4 06/19/2021       LIPID PANEL -   No results found for: \"CHOL\", \"HDL\", \"CHHDL\", \"LDL\", \"VLDL\", \"TRIG\", \"NHDL\"    RENAL FUNCTION PANEL -   Lab Results   Component Value Date    K 3.6 06/19/2021       No results found for: \"BNP\", \"HGBA1C\"    ECG:    Echo: Normal  No echocardiogram results found for the past 12 months       Assessment and Recommendations     Assessment/Plan     1. Postural dizziness with near syncope  No improvement with Florinef.  Complex history.  Blood pressure has been normal. Suggest Head-up tilt table test.  - Follow Up In Cardiology         Sha Rios MD    Exclusive of any other services or procedures performed, I, Sha Rios MD , spent 30 minutes in duration for this visit today.  This time consisted of chart review, obtaining history, and/or performing the exam as documented above as well as documenting the clinical information for the encounter in the electronic record, discussing treatment options, plans, and/or goals with patient, family, and/or caregiver, refilling medications, updating the electronic record, ordering medicines, lab work, imaging, referrals, and/or procedures as documented above and communicating with other [facility] professionals. I have discussed the results of laboratory, radiology, and cardiology studies with the patient and their family/caregiver.    "

## 2024-07-18 ENCOUNTER — TELEPHONE (OUTPATIENT)
Dept: CARDIOLOGY | Facility: CLINIC | Age: 54
End: 2024-07-18
Payer: COMMERCIAL

## 2024-07-18 NOTE — TELEPHONE ENCOUNTER
Called patient had to leave a message to review tilt table instructions.  Tilt is scheduled for   7/24/2024 arrival 10:45 with test starting at 11:30.  NPO after midnight, no caffeine for 24 hours prior, no nicotine or ETOH for 6 hours prior.  Someone needs to drive you home after the test.  Reviewed meds has not taken mounjaro in several months and is taking esomeprazole.  He expressed understanding.

## 2024-07-24 ENCOUNTER — HOSPITAL ENCOUNTER (OUTPATIENT)
Dept: CARDIOLOGY | Facility: HOSPITAL | Age: 54
Discharge: HOME | End: 2024-07-24
Payer: COMMERCIAL

## 2024-07-24 VITALS
HEART RATE: 67 BPM | TEMPERATURE: 98 F | DIASTOLIC BLOOD PRESSURE: 79 MMHG | SYSTOLIC BLOOD PRESSURE: 97 MMHG | RESPIRATION RATE: 18 BRPM

## 2024-07-24 DIAGNOSIS — R42 POSTURAL DIZZINESS WITH NEAR SYNCOPE: ICD-10-CM

## 2024-07-24 DIAGNOSIS — R55 PRE-SYNCOPE: ICD-10-CM

## 2024-07-24 DIAGNOSIS — R55 POSTURAL DIZZINESS WITH NEAR SYNCOPE: ICD-10-CM

## 2024-07-24 DIAGNOSIS — I10 ESSENTIAL HYPERTENSION: ICD-10-CM

## 2024-07-24 PROCEDURE — 93660 TILT TABLE EVALUATION: CPT

## 2024-07-24 PROCEDURE — 93660 TILT TABLE EVALUATION: CPT | Performed by: INTERNAL MEDICINE

## 2024-07-24 ASSESSMENT — PAIN - FUNCTIONAL ASSESSMENT: PAIN_FUNCTIONAL_ASSESSMENT: 0-10

## 2024-09-19 ENCOUNTER — DOCUMENTATION (OUTPATIENT)
Dept: PHYSICAL THERAPY | Facility: CLINIC | Age: 54
End: 2024-09-19
Payer: COMMERCIAL

## 2024-09-19 NOTE — PROGRESS NOTES
"Physical Therapy    Discharge Summary    Name: Ramirez Lazo \"Jesse\"  MRN: [id number]  : 1970  Date: 24    Discharge Summary: PT    Discharge Information: Date of discharge 24, Date of last visit 24, Date of evaluation 24, Number of attended visits 1, Referred by Dr. Sha Rios, and Referred for Postural dizziness and syncope    Therapy Summary: patient seen for evaluation only for vertigo, lightheadedness    Discharge Status: Not known due to non compliance, no further visits scheduled     Rehab Discharge Reason: Failed to schedule and/or keep follow-up appointment(s)  "

## 2024-12-12 NOTE — PROGRESS NOTES
"        Reason for Consult:   Dizziness     Subjective   History Of Present Illness:  Ramirez Lazo \"Kamla" is a 54 y.o. male referred by [facility] for chronic acquired dizziness, bilateral aural fullness, tinnitus, and bilateral sensorineural hearing loss. He presents in office for possible Meniere's disease.     In early 2024, he described disequilibrium and off balance sensation that has now resolved. He has noticed bilateral fullness and tinnitus that has gotten worse. He had existing work up with neurology for consideration of vestibular migraines. He tried migraine diet without improvement.     He visited his cardiologist, Dr. Sha Rios, who diagnosed him with low blood pressure and put him on a high salt diet. He completed a Tilt Test that was normal blood pressure that showed some episodes of hypotension and mild discrepancies between both ears.     Past Medical History:  He has a past medical history of Essential hypertension (05/02/2024) and Postural dizziness with near syncope (05/02/2024).    Surgical History:  He has no past surgical history on file.     Social History:  He reports that he has never smoked. His smokeless tobacco use includes snuff. He reports current alcohol use. He reports that he does not use drugs.    Family History:  family history is not on file.     Medications:  Current Outpatient Medications   Medication Instructions    esomeprazole magnesium 20 mg tablet,delayed release (DR/EC) oral    Mounjaro 2.5 mg/0.5 mL pen injector subcutaneous    multivit-min/ferrous fumarate (MULTI VITAMIN ORAL) oral      Allergies:  Patient has no known allergies.    Review of Systems:   A comprehensive 10-point review of systems was obtained including constitutional, neurological, HEENT, pulmonary, cardiovascular, genito-urinary, and other pertinent systems and was negative except as noted in the HPI.     Objective   Physical Exam:  Last Recorded Vitals: There were no vitals taken for this " "visit.    On physical exam, the patient is a well-nourished, well-developed patient, in no acute distress, able to communicate without assistance in English language. Head and face is atraumatic and normocephalic. Salivary glands are intact. Facial strength is symmetrical bilaterally.       On ear examination:  Right ear: The patient has an open and patent ear canal. The tympanic membrane is intact.  AC>BC  Left ear: The patient has an open and patent ear canal. The tympanic membrane is intact.  AC>BC  The Yi is midline    On vestibular exam, the patient has no spontaneous nystagmus, no headshake nystagmus, no head-thrust nystagmus, and no nystagmus on hyperventilation or Valsalva maneuvers. [place]-Hallpike maneuver is negative bilaterally.       On neuro exam, the patient is alert and oriented x3, cranial nerves are grossly intact, cerebellar exam is normal.      The rest of the exam, including anterior rhinoscopy, oropharyngeal exam, neck exam, and cardiovascular exam, were normal including no palpable lymphadenopathies, thyroid in the midline position, normal pulses, and normal chest excursion.       Reviewed Results:  Audiology Testing:   I personally reviewed the audiogram from 2/2024 that showed:     normal hearing downsloping to high frequency sensory hearing loss bilateral. Discrimination: 100%       I personally reviewed the balance function test from 07/2024 which showed overall normal VHIT and normal vents. There is asymmetry between right and left ears on calorics but not significant    Imaging:  I personally reviewed the MRI of the IAC from 04/09/24 that showed:  No evidence of retrocochlear pathology. There is a small enhancement on IAC that is non specific    Procedure:  None    Assessment/Plan     1. Sensation of fullness in both ears    2. Tinnitus of both ears    3. Sensorineural hearing loss (SNHL) of both ears        In summary, Ramirez Lazo \"Jesse\" is a 54 y.o. male with bilateral sensory " hearing loss and high frequency tinnitus. I recommended a hearing aid evaluation to see if this can improve the tinnitus.     There is no evidence of vestibular disease. I do not think this is Meniere's as he lacks low frequency hearing loss, vertigo spells, and abnormal vestibular testing. His off balance sensation has improved with increased salt intake. This is reassuring it is not meniere's. He will continue monitoring with cardiology.      Follow-up in 6 months with audiogram. I provided order for hearing aid evaluation, as he would benefit from hearing aids.       ____________________________________________________  Galo Wei MD  Professor and Chief   Otology/Neurotology/Lateral Skull-Base Surgery   [facility]      Scribe Attestation  By signing my name below, I, Stiven Laddfabi, Scribe   attest that this documentation has been prepared under the direction and in the presence of Galo Lai MD.

## 2024-12-13 ENCOUNTER — APPOINTMENT (OUTPATIENT)
Dept: OTOLARYNGOLOGY | Facility: CLINIC | Age: 54
End: 2024-12-13
Payer: COMMERCIAL

## 2024-12-13 DIAGNOSIS — H93.13 TINNITUS OF BOTH EARS: ICD-10-CM

## 2024-12-13 DIAGNOSIS — H90.3 SENSORINEURAL HEARING LOSS (SNHL) OF BOTH EARS: ICD-10-CM

## 2024-12-13 DIAGNOSIS — H93.8X3 SENSATION OF FULLNESS IN BOTH EARS: Primary | ICD-10-CM

## 2024-12-13 PROBLEM — H93.8X2 SENSATION OF FULLNESS IN LEFT EAR: Status: ACTIVE | Noted: 2024-12-13

## 2024-12-13 PROCEDURE — 99214 OFFICE O/P EST MOD 30 MIN: CPT | Performed by: OTOLARYNGOLOGY

## 2024-12-13 NOTE — LETTER
"December 17, 2024     Jaspreet Charles MD  [address] [facility] [address]    Patient: Jesse Lazo   YOB: 1970   Date of Visit: 12/13/2024       Dear Dr. Jaspreet Charles MD:    Thank you for referring Jesse Lazo to me for evaluation. Below are my notes for this consultation.  If you have questions, please do not hesitate to call me. I look forward to following your patient along with you.       Sincerely,     Galo Lai MD      CC: Olga Levy, APRN-CNP  Junitoytemnzach Elder, APRN-CNP  ______________________________________________________________________________________            Reason for Consult:   Dizziness     Subjective  History Of Present Illness:  Ramirez Lazo \"Kamla" is a 54 y.o. male referred by Olga Levy for chronic acquired dizziness, bilateral aural fullness, tinnitus, and bilateral sensorineural hearing loss. He presents in office for possible Meniere's disease.     In early 2024, he described disequilibrium and off balance sensation that has now resolved. He has noticed bilateral fullness and tinnitus that has gotten worse. He had existing work up with neurology for consideration of vestibular migraines. He tried migraine diet without improvement.     He visited his cardiologist, Dr. Sha Rios, who diagnosed him with low blood pressure and put him on a high salt diet. He completed a Tilt Test that was normal blood pressure that showed some episodes of hypotension and mild discrepancies between both ears.     Past Medical History:  He has a past medical history of Essential hypertension (05/02/2024) and Postural dizziness with near syncope (05/02/2024).    Surgical History:  He has no past surgical history on file.     Social History:  He reports that he has never smoked. His smokeless tobacco use includes snuff. He reports current alcohol use. He reports that he does not use drugs.    Family History:  family history is not on file.   "   Medications:  Current Outpatient Medications   Medication Instructions   • esomeprazole magnesium 20 mg tablet,delayed release (DR/EC) oral   • Mounjaro 2.5 mg/0.5 mL pen injector subcutaneous   • multivit-min/ferrous fumarate (MULTI VITAMIN ORAL) oral      Allergies:  Patient has no known allergies.    Review of Systems:   A comprehensive 10-point review of systems was obtained including constitutional, neurological, HEENT, pulmonary, cardiovascular, genito-urinary, and other pertinent systems and was negative except as noted in the HPI.     Objective  Physical Exam:  Last Recorded Vitals: There were no vitals taken for this visit.    On physical exam, the patient is a well-nourished, well-developed patient, in no acute distress, able to communicate without assistance in English language. Head and face is atraumatic and normocephalic. Salivary glands are intact. Facial strength is symmetrical bilaterally.       On ear examination:  Right ear: The patient has an open and patent ear canal. The tympanic membrane is intact.  AC>BC  Left ear: The patient has an open and patent ear canal. The tympanic membrane is intact.  AC>BC  The Yi is midline    On vestibular exam, the patient has no spontaneous nystagmus, no headshake nystagmus, no head-thrust nystagmus, and no nystagmus on hyperventilation or Valsalva maneuvers. Michael-Hallpike maneuver is negative bilaterally.       On neuro exam, the patient is alert and oriented x3, cranial nerves are grossly intact, cerebellar exam is normal.      The rest of the exam, including anterior rhinoscopy, oropharyngeal exam, neck exam, and cardiovascular exam, were normal including no palpable lymphadenopathies, thyroid in the midline position, normal pulses, and normal chest excursion.       Reviewed Results:  Audiology Testing:   I personally reviewed the audiogram from 2/2024 that showed:     normal hearing downsloping to high frequency sensory hearing loss bilateral.  "Discrimination: 100%       I personally reviewed the balance function test from 07/2024 which showed overall normal VHIT and normal vents. There is asymmetry between right and left ears on calorics but not significant    Imaging:  I personally reviewed the MRI of the IAC from 04/09/24 that showed:  No evidence of retrocochlear pathology. There is a small enhancement on IAC that is non specific    Procedure:  None    Assessment/Plan    1. Sensation of fullness in both ears    2. Tinnitus of both ears    3. Sensorineural hearing loss (SNHL) of both ears        In summary, Ramirez Lazo \"Jesse\" is a 54 y.o. male with bilateral sensory hearing loss and high frequency tinnitus. I recommended a hearing aid evaluation to see if this can improve the tinnitus.     There is no evidence of vestibular disease. I do not think this is Meniere's as he lacks low frequency hearing loss, vertigo spells, and abnormal vestibular testing. His off balance sensation has improved with increased salt intake. This is reassuring it is not meniere's. He will continue monitoring with cardiology.      Follow-up in 6 months with audiogram. I provided order for hearing aid evaluation, as he would benefit from hearing aids.       ____________________________________________________  Galo Wei MD  Professor and Chief   Otology/Neurotology/Lateral Skull-Base Surgery   [facility]      Scribe Attestation  By signing my name below, I, Stiven Bolden, Scribe   attest that this documentation has been prepared under the direction and in the presence of Galo Lai MD.  "

## 2025-01-30 ENCOUNTER — APPOINTMENT (OUTPATIENT)
Dept: AUDIOLOGY | Facility: CLINIC | Age: 55
End: 2025-01-30
Payer: COMMERCIAL

## 2025-03-19 ENCOUNTER — APPOINTMENT (OUTPATIENT)
Dept: AUDIOLOGY | Facility: CLINIC | Age: 55
End: 2025-03-19
Payer: COMMERCIAL

## 2025-03-19 DIAGNOSIS — H90.3 SENSORINEURAL HEARING LOSS (SNHL) OF BOTH EARS: Primary | ICD-10-CM

## 2025-03-19 NOTE — PROGRESS NOTES
[facility]UD[facility]  HEARING AID EVALUATION      Name:  Ramirez Lazo  YOB: 1970  Today's date:  03/19/25      PATIENT LISTENING NEEDS  Patient states that he occasionally struggles with clarity when someone is speaking turned away from him or from another room.  Day to day conversation in quiet has been good.  He also reports bilateral fluctuating tinnitus.    TECHNOLOGY DISCUSSION AND RECOMMENDATION  Demonstrated amplification with good audibility and some reduction in tinnitus.    Quoted for binaural Phonak Audeo L90-R devices (P1 color, size 2M receivers, large open domes).    Patient just got a new insurance plan.  He presented the card today.  I will call on benefits.    *I called on benefits 3/20/2025 and the patient's health insurance does not cover hearing aids.    I left a message on the patient's voicemail.    FOLLOW UP    The patient will call when he is ready to proceed.    APPOINTMENT TIME  2:00pm-3:00pm    Kaushik Rob  Doctor of Audiology  Senior Audiologist

## 2025-06-13 ENCOUNTER — APPOINTMENT (OUTPATIENT)
Dept: OTOLARYNGOLOGY | Facility: CLINIC | Age: 55
End: 2025-06-13
Payer: COMMERCIAL

## 2025-06-13 ENCOUNTER — APPOINTMENT (OUTPATIENT)
Dept: AUDIOLOGY | Facility: CLINIC | Age: 55
End: 2025-06-13
Payer: COMMERCIAL

## 2025-06-17 ENCOUNTER — APPOINTMENT (OUTPATIENT)
Dept: OTOLARYNGOLOGY | Facility: CLINIC | Age: 55
End: 2025-06-17
Payer: COMMERCIAL